# Patient Record
Sex: FEMALE | Race: WHITE | Employment: PART TIME | URBAN - NONMETROPOLITAN AREA
[De-identification: names, ages, dates, MRNs, and addresses within clinical notes are randomized per-mention and may not be internally consistent; named-entity substitution may affect disease eponyms.]

---

## 2024-08-05 ENCOUNTER — HOSPITAL ENCOUNTER (OUTPATIENT)
Dept: WOMENS IMAGING | Age: 54
Discharge: HOME | End: 2024-08-05
Payer: COMMERCIAL

## 2024-08-05 VITALS — WEIGHT: 115 LBS | BODY MASS INDEX: 21.16 KG/M2 | HEIGHT: 62 IN

## 2024-08-05 DIAGNOSIS — Z12.31 VISIT FOR SCREENING MAMMOGRAM: ICD-10-CM

## 2024-08-05 PROCEDURE — 77063 BREAST TOMOSYNTHESIS BI: CPT

## 2024-08-30 ENCOUNTER — OFFICE VISIT (OUTPATIENT)
Dept: SURGERY | Age: 54
End: 2024-08-30

## 2024-08-30 VITALS
HEART RATE: 66 BPM | OXYGEN SATURATION: 99 % | WEIGHT: 115 LBS | BODY MASS INDEX: 21.16 KG/M2 | HEIGHT: 62 IN | SYSTOLIC BLOOD PRESSURE: 116 MMHG | DIASTOLIC BLOOD PRESSURE: 78 MMHG | TEMPERATURE: 98.4 F

## 2024-08-30 DIAGNOSIS — L72.3 SEBACEOUS CYST: Primary | ICD-10-CM

## 2024-08-30 RX ORDER — DOXYCYCLINE HYCLATE 100 MG
100 TABLET ORAL 2 TIMES DAILY
Qty: 14 TABLET | Refills: 0 | Status: SHIPPED | OUTPATIENT
Start: 2024-08-30 | End: 2024-09-06

## 2024-08-30 RX ORDER — DOXYCYCLINE HYCLATE 100 MG
100 TABLET ORAL 2 TIMES DAILY
COMMUNITY

## 2024-08-30 NOTE — PROGRESS NOTES
PROCEDURE NOTE        PATIENT NAME: Nadia Diaz  MEDICAL RECORD NO. [id number]  DATE: 08/30/2024     SURGEON: Sharri Lugo MD     PREOPERATIVE DIAGNOSIS: infected cyst        POSTOPERATIVE DIAGNOSIS: same    PROCEDURE PERFORMED:   excision of infected cyst     ANESTHESIA: Local  With 2% lidocaine        Indication: Nadia Diaz is a 54 y.o. female. The history and physical examination were reviewed and confirmed.  D4-year[age]old female who has had chronic issues and swelling at the site of the prior drain after her tummy tuck.  And recently started to have what looked to be infection draining.  She has been started on doxycycline and the infection is improved but continues to have pain and swelling in the area.  Discussed incision and drainage versus excision.  Patient would like to proceed the diagnoses, proposed procedure, risks, possible complications, benefits and alternatives were discussed with the patient or family. She was given the opportunity to ask questions, and once answered, informed consent was obtained. The patient was then prepared for the procedure.     PROCEDURE: A timeout was initiated and the procedure and patient were confirmed by those present. The area was prepped and draped in sterile fashion.  10 cc of local anesthetic was used to anesthestized the area.  Initially an incision was made over the most fluctuant part.  There was drainage and minimal pus however there was material consistent with a sebaceous cyst or fat necrosis.  An elliptical incision was then made around the remainder of it, it was irrigated copiously.  It was loosely closed with interrupted nylon suture.  Sterile dressing was applied.  She tolerated the procedure well.    ESTIMATED BLOOD LOSS: 3ml     COMPLICATIONS: None immediately appreciated.     Electronically signed by Sharri Lugo MD  on 8/30/2024 at 2:20 PM

## 2025-03-24 ENCOUNTER — LAB (OUTPATIENT)
Dept: LAB | Age: 55
End: 2025-03-24

## 2025-04-03 LAB — CYTOLOGY THIN PREP PAP: NORMAL
